# Patient Record
Sex: MALE | Race: WHITE | Employment: FULL TIME | URBAN - METROPOLITAN AREA
[De-identification: names, ages, dates, MRNs, and addresses within clinical notes are randomized per-mention and may not be internally consistent; named-entity substitution may affect disease eponyms.]

---

## 2023-02-25 ENCOUNTER — HOSPITAL ENCOUNTER (EMERGENCY)
Age: 33
Discharge: HOME | End: 2023-02-25
Attending: EMERGENCY MEDICINE

## 2023-02-25 ENCOUNTER — APPOINTMENT (OUTPATIENT)
Dept: GENERAL RADIOLOGY | Age: 33
End: 2023-02-25
Attending: EMERGENCY MEDICINE

## 2023-02-25 VITALS
WEIGHT: 170.42 LBS | HEIGHT: 72 IN | BODY MASS INDEX: 23.08 KG/M2 | HEART RATE: 63 BPM | TEMPERATURE: 98.4 F | DIASTOLIC BLOOD PRESSURE: 73 MMHG | OXYGEN SATURATION: 98 % | SYSTOLIC BLOOD PRESSURE: 127 MMHG | RESPIRATION RATE: 16 BRPM

## 2023-02-25 DIAGNOSIS — S62.305A CLOSED FRACTURE OF FOURTH METACARPAL BONE OF LEFT HAND, UNSPECIFIED FRACTURE MORPHOLOGY, INITIAL ENCOUNTER: ICD-10-CM

## 2023-02-25 DIAGNOSIS — S69.92XA HAND INJURY, LEFT, INITIAL ENCOUNTER: Primary | ICD-10-CM

## 2023-02-25 PROCEDURE — 73120 X-RAY EXAM OF HAND: CPT

## 2023-02-25 PROCEDURE — 99283 EMERGENCY DEPT VISIT LOW MDM: CPT

## 2023-02-25 PROCEDURE — 75810000053 HC SPLINT APPLICATION

## 2023-02-25 PROCEDURE — 74011250637 HC RX REV CODE- 250/637: Performed by: EMERGENCY MEDICINE

## 2023-02-25 RX ORDER — IBUPROFEN 800 MG/1
800 TABLET ORAL
Status: COMPLETED | OUTPATIENT
Start: 2023-02-25 | End: 2023-02-25

## 2023-02-25 RX ORDER — NAPROXEN 500 MG/1
500 TABLET ORAL 2 TIMES DAILY WITH MEALS
Qty: 20 TABLET | Refills: 0 | Status: SHIPPED | OUTPATIENT
Start: 2023-02-25 | End: 2023-03-07

## 2023-02-25 RX ADMIN — IBUPROFEN 800 MG: 800 TABLET, FILM COATED ORAL at 20:39

## 2023-02-26 NOTE — ED PROVIDER NOTES
58-year-old male who presents to the ER with a complaint of left hand injury sustained after he accidentally struck to an upward motion the back of his hand against another individual's elbow while jumping on the trampoline this evening. The patient is right-handed. He is complaining of abdominal throbbing discomfort, severity 5 out of 10, worse with movement, no relieving factors. He denies any further discomfort at this time. History reviewed. No pertinent past medical history. History reviewed. No pertinent surgical history. History reviewed. No pertinent family history. Social History     Socioeconomic History    Marital status: Not on file     Spouse name: Not on file    Number of children: Not on file    Years of education: Not on file    Highest education level: Not on file   Occupational History    Not on file   Tobacco Use    Smoking status: Some Days     Types: Cigarettes    Smokeless tobacco: Current   Substance and Sexual Activity    Alcohol use: Not Currently    Drug use: Never    Sexual activity: Not on file   Other Topics Concern    Not on file   Social History Narrative    Not on file     Social Determinants of Health     Financial Resource Strain: Not on file   Food Insecurity: Not on file   Transportation Needs: Not on file   Physical Activity: Not on file   Stress: Not on file   Social Connections: Not on file   Intimate Partner Violence: Not on file   Housing Stability: Not on file         ALLERGIES: Penicillin g    Review of Systems   All other systems reviewed and are negative. Vitals:    02/25/23 2027 02/25/23 2028   BP:  125/79   Pulse:  77   Resp:  16   Temp:  98.4 °F (36.9 °C)   SpO2:  97%   Weight: 77.3 kg (170 lb 6.7 oz)    Height: 6' (1.829 m)             Physical Exam  Vitals and nursing note reviewed. Exam conducted with a chaperone present. Musculoskeletal:      Right hand: Normal.      Left hand: Swelling, tenderness and bony tenderness present.  No deformity or lacerations. Decreased range of motion. Normal strength. Normal sensation. There is no disruption of two-point discrimination. Normal capillary refill. Normal pulse. Hands:    CONSTITUTIONAL: Well-appearing; well-nourished; in no apparent distress  HEAD: Normocephalic; atraumatic  EYES: PERRL; EOM intact; conjunctiva and sclera are clear bilaterally. ENT: No rhinorrhea; normal pharynx with no tonsillar hypertrophy; mucous membranes pink/moist, no erythema, no exudate. NECK: Supple; non-tender; no cervical lymphadenopathy  CARD: Normal S1, S2; no murmurs, rubs, or gallops. Regular rate and rhythm. RESP: Normal respiratory effort; breath sounds clear and equal bilaterally; no wheezes, rhonchi, or rales. ABD: Normal bowel sounds; non-distended; non-tender; no palpable organomegaly, no masses, no bruits. Back Exam: Normal inspection; no vertebral point tenderness, no CVA tenderness. Normal range of motion. EXT: Normal ROM in all four extremities; non-tender to palpation; no swelling or deformity; distal pulses are normal, no edema. SKIN: Warm; dry; no rash. NEURO:Alert and oriented x 3, coherent, BRITT-XII grossly intact, sensory and motor are non-focal.         Medical Decision Making  Assessment: 23-year-old male with left hand injury rule out fracture suspect sprain/contusion    Plan: X-ray of the left hand/analgesia/education, reassurance, symptomatic treatment/serial exam/ Monitor and Reevaluate. Amount and/or Complexity of Data Reviewed  Radiology: ordered. Risk  Prescription drug management. Procedures    XRAY INTERPRETATION (ED MD)  Xray of left hand shows closed fracture of fourth metacarpal bone. No subluxation/dislocation. Flores Monteiro MD 8:36 PM     Splint applied by bedside nurse and evaluated by Dani Pascual MD.  Neurovascular status intact post splint application. Desired position maintained.          Progress Note:   Pt has been reexamined by Dani Pascual MD. Pt is feeling much better. Symptoms have improved. All available results have been reviewed with pt and any available family. Pt understands sx, dx, and tx in ED. Care plan has been outlined and questions have been answered. Pt is ready to go home. Will send home on hand fracture instruction and splint care education. Prescription of naproxen. Outpatient referral with orthopedist for reevaluation and further treatment as needed. Written by Yates Prader, MD,8:36 PM    .   .

## 2023-02-26 NOTE — ED NOTES
ulnar gutter splint applied to LEFT forearm/hand. Pt tolerated splint application well. neurovasc intact prior and after splint application. Instructions of splint care provided. Pt verbalized understanding. All questions answered.      Pt requested copy of xray

## 2023-02-26 NOTE — ED TRIAGE NOTES
Arrives with c/o of LEFT hand numbness and limted ROM after jumping on a trampoline and he hit his hand on his girlfriend's arm when they were jumping in opposite ways. Had not self medicated. +4th digit pain.

## 2023-02-26 NOTE — ED NOTES
Discharge instructions given to pt by RN. Pt educated on prescribed medications, splint application, and ortho f/u in teach back method and verbalizes understanding. Opportunity for questions provided. Pt ambulatory out of unit, in no acute distress and taken home by family.